# Patient Record
Sex: FEMALE | Race: WHITE
[De-identification: names, ages, dates, MRNs, and addresses within clinical notes are randomized per-mention and may not be internally consistent; named-entity substitution may affect disease eponyms.]

---
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
## 2020-04-24 NOTE — US
EXAM DESCRIPTION: Abdomen,Complete



CLINICAL HISTORY: 19 years Female, recent splenectomy, now with

abd pain.  



COMPARISON: None.



TECHNIQUE: Multiple sonographic images of the abdomen with color

Doppler analysis.



FINDINGS: 

Liver: Liver measures 15.8 cm. Main portal vein is patent with

appropriate directional flow.  



Biliary Tree: Gallbladder surgically absent. No intrahepatic

biliary dilatation. Common bile duct diameter of 5 mm. 



Pancreas:  Normal visualized portions of the pancreas.



Spleen: Not visualized possibly consistent with patient's history

of prior splenectomy



Kidneys: Normal size and location without hydronephrosis.



Vasculature: Unremarkable upper abdominal aorta and IVC.



Abdominal cavity: No ascites. 



IMPRESSION:  

1. No acute sonographic abnormality of the abdomen.

2. Nonvisualized spleen consistent with prior splenectomy.

3. Cholecystectomy.



Electronically signed by:  Harshal Owen MD  4/24/2020 11:51 AM CDT

Workstation: [id number]